# Patient Record
Sex: FEMALE | Race: WHITE | NOT HISPANIC OR LATINO | Employment: OTHER | RURAL
[De-identification: names, ages, dates, MRNs, and addresses within clinical notes are randomized per-mention and may not be internally consistent; named-entity substitution may affect disease eponyms.]

---

## 2017-02-07 ENCOUNTER — OFFICE VISIT (OUTPATIENT)
Dept: RETAIL CLINIC | Facility: CLINIC | Age: 27
End: 2017-02-07

## 2017-02-07 VITALS
HEIGHT: 64 IN | BODY MASS INDEX: 35 KG/M2 | OXYGEN SATURATION: 98 % | HEART RATE: 102 BPM | WEIGHT: 205 LBS | TEMPERATURE: 98.3 F | RESPIRATION RATE: 16 BRPM

## 2017-02-07 DIAGNOSIS — R05.9 COUGHING: ICD-10-CM

## 2017-02-07 DIAGNOSIS — J30.2 SEASONAL ALLERGIC RHINITIS, UNSPECIFIED ALLERGIC RHINITIS TRIGGER: ICD-10-CM

## 2017-02-07 DIAGNOSIS — J01.40 ACUTE PANSINUSITIS, RECURRENCE NOT SPECIFIED: Primary | ICD-10-CM

## 2017-02-07 PROCEDURE — 99213 OFFICE O/P EST LOW 20 MIN: CPT | Performed by: NURSE PRACTITIONER

## 2017-02-07 RX ORDER — PSEUDOEPHEDRINE HCL 120 MG/1
120 TABLET, FILM COATED, EXTENDED RELEASE ORAL EVERY 12 HOURS
Qty: 20 TABLET | Refills: 0 | Status: SHIPPED | OUTPATIENT
Start: 2017-02-07 | End: 2017-02-17

## 2017-02-07 RX ORDER — PREDNISONE 10 MG/1
TABLET ORAL DAILY
Qty: 21 EACH | Refills: 0 | Status: SHIPPED | OUTPATIENT
Start: 2017-02-07 | End: 2017-02-13

## 2017-02-07 RX ORDER — ALBUTEROL SULFATE 90 UG/1
2 AEROSOL, METERED RESPIRATORY (INHALATION) EVERY 4 HOURS PRN
Qty: 1 INHALER | Refills: 0 | Status: SHIPPED | OUTPATIENT
Start: 2017-02-07 | End: 2017-09-19 | Stop reason: SDUPTHER

## 2017-02-07 RX ORDER — AMOXICILLIN AND CLAVULANATE POTASSIUM 875; 125 MG/1; MG/1
1 TABLET, FILM COATED ORAL 2 TIMES DAILY
Qty: 20 TABLET | Refills: 0 | Status: SHIPPED | OUTPATIENT
Start: 2017-02-07 | End: 2017-02-17

## 2017-02-07 RX ORDER — LORATADINE 10 MG/1
10 TABLET ORAL DAILY
Qty: 30 TABLET | Refills: 5 | Status: SHIPPED | OUTPATIENT
Start: 2017-02-07 | End: 2017-03-09

## 2017-02-07 RX ORDER — DEXTROMETHORPHAN HYDROBROMIDE AND PROMETHAZINE HYDROCHLORIDE 15; 6.25 MG/5ML; MG/5ML
5 SYRUP ORAL 4 TIMES DAILY PRN
Qty: 240 ML | Refills: 0 | Status: SHIPPED | OUTPATIENT
Start: 2017-02-07 | End: 2017-05-19

## 2017-02-07 NOTE — PROGRESS NOTES
Subjective   Janina Galvez is a 26 y.o. female.     Sinus Problem   This is a new problem. The current episode started 1 to 4 weeks ago. The problem has been gradually worsening since onset. There has been no fever. The pain is moderate. Associated symptoms include congestion, coughing (persistent cough), headaches, a hoarse voice, sinus pressure, a sore throat and swollen glands. Pertinent negatives include no chills, ear pain, neck pain, shortness of breath or sneezing. Treatments tried: otc cough and congestion medication. The treatment provided no relief.   Cough   This is a new problem. Episode onset: 3 days. The problem has been gradually worsening. The problem occurs every few minutes. Associated symptoms include headaches, nasal congestion, postnasal drip, rhinorrhea and a sore throat. Pertinent negatives include no chest pain, chills, ear pain, fever, rash, shortness of breath or wheezing. The symptoms are aggravated by cold air and exercise. Risk factors for lung disease include smoking/tobacco exposure. She has tried OTC cough suppressant for the symptoms. The treatment provided no relief. Her past medical history is significant for asthma and bronchitis. There is no history of pneumonia.        The following portions of the patient's history were reviewed and updated as appropriate: allergies, current medications, past family history, past medical history, past social history, past surgical history and problem list.    Review of Systems   Constitutional: Negative for appetite change, chills and fever.   HENT: Positive for congestion, hoarse voice, postnasal drip, rhinorrhea, sinus pressure and sore throat. Negative for ear pain, sneezing and trouble swallowing.    Eyes: Negative.    Respiratory: Positive for cough (persistent cough) and chest tightness. Negative for shortness of breath and wheezing.    Cardiovascular: Negative.  Negative for chest pain.   Gastrointestinal: Negative for abdominal pain,  "diarrhea, nausea and vomiting.   Musculoskeletal: Negative.  Negative for neck pain.   Skin: Negative.  Negative for rash.   Neurological: Positive for headaches. Negative for dizziness.        Visit Vitals   • Pulse 102   • Temp 98.3 °F (36.8 °C)   • Resp 16   • Ht 64\" (162.6 cm)   • Wt 205 lb (93 kg)   • LMP 01/31/2017   • SpO2 98%   • BMI 35.19 kg/m2        Objective   Physical Exam   Constitutional: She is oriented to person, place, and time. Vital signs are normal. She appears well-developed and well-nourished.   HENT:   Head: Normocephalic.   Right Ear: External ear and ear canal normal. No drainage, swelling or tenderness. Tympanic membrane is bulging. Tympanic membrane is not erythematous.   Left Ear: External ear and ear canal normal. No drainage, swelling or tenderness. Tympanic membrane is bulging. Tympanic membrane is not erythematous.   Nose: Mucosal edema and rhinorrhea present. Right sinus exhibits maxillary sinus tenderness and frontal sinus tenderness. Left sinus exhibits maxillary sinus tenderness and frontal sinus tenderness.   Mouth/Throat: Uvula is midline, oropharynx is clear and moist and mucous membranes are normal. Tonsils are 0 on the right. Tonsils are 0 on the left. No tonsillar exudate.   Eyes: Conjunctivae are normal. Pupils are equal, round, and reactive to light.   Cardiovascular: Normal rate, regular rhythm, S1 normal, S2 normal and normal heart sounds.    Pulmonary/Chest: Effort normal and breath sounds normal. No respiratory distress. She has no wheezes.   Lymphadenopathy:        Head (right side): Tonsillar adenopathy present.        Head (left side): Tonsillar adenopathy present.     She has no cervical adenopathy.   Neurological: She is alert and oriented to person, place, and time.   Skin: Skin is warm, dry and intact. No rash noted.   Psychiatric: She has a normal mood and affect. Her speech is normal and behavior is normal. Thought content normal.   Vitals " reviewed.      Assessment/Plan   Janina was seen today for sinus problem.    Diagnoses and all orders for this visit:    Acute pansinusitis, recurrence not specified  -     amoxicillin-clavulanate (AUGMENTIN) 875-125 MG per tablet; Take 1 tablet by mouth 2 (Two) Times a Day for 10 days.  -     pseudoephedrine (SUDAFED) 120 MG 12 hr tablet; Take 1 tablet by mouth Every 12 (Twelve) Hours for 10 days.    Seasonal allergic rhinitis, unspecified allergic rhinitis trigger  -     pseudoephedrine (SUDAFED) 120 MG 12 hr tablet; Take 1 tablet by mouth Every 12 (Twelve) Hours for 10 days.  -     loratadine (CLARITIN) 10 MG tablet; Take 1 tablet by mouth Daily for 30 days.    Coughing  -     PredniSONE (DELTASONE) 10 MG (21) tablet pack; Take  by mouth Daily for 6 days.  -     albuterol (PROVENTIL HFA;VENTOLIN HFA) 108 (90 BASE) MCG/ACT inhaler; Inhale 2 puffs Every 4 (Four) Hours As Needed for wheezing or shortness of air.  -     promethazine-dextromethorphan (PROMETHAZINE-DM) 6.25-15 MG/5ML syrup; Take 5 mL by mouth 4 (Four) Times a Day As Needed for cough.

## 2017-02-07 NOTE — PATIENT INSTRUCTIONS
Sinusitis, Adult  Sinusitis is redness, soreness, and inflammation of the paranasal sinuses. Paranasal sinuses are air pockets within the bones of your face. They are located beneath your eyes, in the middle of your forehead, and above your eyes. In healthy paranasal sinuses, mucus is able to drain out, and air is able to circulate through them by way of your nose. However, when your paranasal sinuses are inflamed, mucus and air can become trapped. This can allow bacteria and other germs to grow and cause infection.  Sinusitis can develop quickly and last only a short time (acute) or continue over a long period (chronic). Sinusitis that lasts for more than 12 weeks is considered chronic.  CAUSES  Causes of sinusitis include:  · Allergies.  · Structural abnormalities, such as displacement of the cartilage that separates your nostrils (deviated septum), which can decrease the air flow through your nose and sinuses and affect sinus drainage.  · Functional abnormalities, such as when the small hairs (cilia) that line your sinuses and help remove mucus do not work properly or are not present.  SIGNS AND SYMPTOMS  Symptoms of acute and chronic sinusitis are the same. The primary symptoms are pain and pressure around the affected sinuses. Other symptoms include:  · Upper toothache.  · Earache.  · Headache.  · Bad breath.  · Decreased sense of smell and taste.  · A cough, which worsens when you are lying flat.  · Fatigue.  · Fever.  · Thick drainage from your nose, which often is green and may contain pus (purulent).  · Swelling and warmth over the affected sinuses.  DIAGNOSIS  Your health care provider will perform a physical exam. During your exam, your health care provider may perform any of the following to help determine if you have acute sinusitis or chronic sinusitis:  · Look in your nose for signs of abnormal growths in your nostrils (nasal polyps).  · Tap over the affected sinus to check for signs of  infection.  · View the inside of your sinuses using an imaging device that has a light attached (endoscope).  If your health care provider suspects that you have chronic sinusitis, one or more of the following tests may be recommended:  · Allergy tests.  · Nasal culture. A sample of mucus is taken from your nose, sent to a lab, and screened for bacteria.  · Nasal cytology. A sample of mucus is taken from your nose and examined by your health care provider to determine if your sinusitis is related to an allergy.  TREATMENT  Most cases of acute sinusitis are related to a viral infection and will resolve on their own within 10 days. Sometimes, medicines are prescribed to help relieve symptoms of both acute and chronic sinusitis. These may include pain medicines, decongestants, nasal steroid sprays, or saline sprays.  However, for sinusitis related to a bacterial infection, your health care provider will prescribe antibiotic medicines. These are medicines that will help kill the bacteria causing the infection.  Rarely, sinusitis is caused by a fungal infection. In these cases, your health care provider will prescribe antifungal medicine.  For some cases of chronic sinusitis, surgery is needed. Generally, these are cases in which sinusitis recurs more than 3 times per year, despite other treatments.  HOME CARE INSTRUCTIONS  · Drink plenty of water. Water helps thin the mucus so your sinuses can drain more easily.  · Use a humidifier.  · Inhale steam 3-4 times a day (for example, sit in the bathroom with the shower running).  · Apply a warm, moist washcloth to your face 3-4 times a day, or as directed by your health care provider.  · Use saline nasal sprays to help moisten and clean your sinuses.  · Take medicines only as directed by your health care provider.  · If you were prescribed either an antibiotic or antifungal medicine, finish it all even if you start to feel better.  SEEK IMMEDIATE MEDICAL CARE IF:  · You have  increasing pain or severe headaches.  · You have nausea, vomiting, or drowsiness.  · You have swelling around your face.  · You have vision problems.  · You have a stiff neck.  · You have difficulty breathing.     This information is not intended to replace advice given to you by your health care provider. Make sure you discuss any questions you have with your health care provider.     Document Released: 12/18/2006 Document Revised: 01/08/2016 Document Reviewed: 01/01/2013  [employer] Interactive Patient Education ©2016 [employer]    Cough, Adult  Coughing is a reflex that clears your throat and your airways. Coughing helps to heal and protect your lungs. It is normal to cough occasionally, but a cough that happens with other symptoms or lasts a long time may be a sign of a condition that needs treatment. A cough may last only 2-3 weeks (acute), or it may last longer than 8 weeks (chronic).  CAUSES  Coughing is commonly caused by:  · Breathing in substances that irritate your lungs.  · A viral or bacterial respiratory infection.  · Allergies.  · Asthma.  · Postnasal drip.  · Smoking.  · Acid backing up from the stomach into the esophagus (gastroesophageal reflux).  · Certain medicines.  · Chronic lung problems, including COPD (or rarely, lung cancer).  · Other medical conditions such as heart failure.  HOME CARE INSTRUCTIONS   Pay attention to any changes in your symptoms. Take these actions to help with your discomfort:  · Take medicines only as told by your health care provider.    If you were prescribed an antibiotic medicine, take it as told by your health care provider. Do not stop taking the antibiotic even if you start to feel better.    Talk with your health care provider before you take a cough suppressant medicine.  · Drink enough fluid to keep your urine clear or pale yellow.  · If the air is dry, use a cold steam vaporizer or humidifier in your bedroom or your home to help loosen secretions.  · Avoid  anything that causes you to cough at work or at home.  · If your cough is worse at night, try sleeping in a semi-upright position.  · Avoid cigarette smoke. If you smoke, quit smoking. If you need help quitting, ask your health care provider.  · Avoid caffeine.  · Avoid alcohol.  · Rest as needed.  SEEK MEDICAL CARE IF:   · You have new symptoms.  · You cough up pus.  · Your cough does not get better after 2-3 weeks, or your cough gets worse.  · You cannot control your cough with suppressant medicines and you are losing sleep.  · You develop pain that is getting worse or pain that is not controlled with pain medicines.  · You have a fever.  · You have unexplained weight loss.  · You have night sweats.  SEEK IMMEDIATE MEDICAL CARE IF:  · You cough up blood.  · You have difficulty breathing.  · Your heartbeat is very fast.     This information is not intended to replace advice given to you by your health care provider. Make sure you discuss any questions you have with your health care provider.     Document Released: 06/15/2012 Document Revised: 09/07/2016 Document Reviewed: 02/24/2016  [employer] Interactive Patient Education ©2016 [employer]

## 2017-05-19 ENCOUNTER — OFFICE VISIT (OUTPATIENT)
Dept: RETAIL CLINIC | Facility: CLINIC | Age: 27
End: 2017-05-19

## 2017-05-19 VITALS
BODY MASS INDEX: 35.34 KG/M2 | TEMPERATURE: 98.7 F | RESPIRATION RATE: 14 BRPM | HEIGHT: 64 IN | WEIGHT: 207 LBS | HEART RATE: 94 BPM | OXYGEN SATURATION: 99 %

## 2017-05-19 DIAGNOSIS — J02.9 SORE THROAT: Primary | ICD-10-CM

## 2017-05-19 LAB
EXPIRATION DATE: NORMAL
INTERNAL CONTROL: NORMAL
Lab: NORMAL
S PYO AG THROAT QL: NEGATIVE

## 2017-05-19 PROCEDURE — 99213 OFFICE O/P EST LOW 20 MIN: CPT | Performed by: NURSE PRACTITIONER

## 2017-05-19 PROCEDURE — 87880 STREP A ASSAY W/OPTIC: CPT | Performed by: NURSE PRACTITIONER

## 2017-05-19 RX ORDER — AZITHROMYCIN 250 MG/1
TABLET, FILM COATED ORAL
Qty: 6 TABLET | Refills: 0 | Status: SHIPPED | OUTPATIENT
Start: 2017-05-19 | End: 2017-09-19

## 2017-05-20 ENCOUNTER — HOSPITAL ENCOUNTER (EMERGENCY)
Facility: HOSPITAL | Age: 27
Discharge: HOME | End: 2017-05-20
Attending: STUDENT IN AN ORGANIZED HEALTH CARE EDUCATION/TRAINING PROGRAM | Admitting: STUDENT IN AN ORGANIZED HEALTH CARE EDUCATION/TRAINING PROGRAM

## 2017-05-20 VITALS
DIASTOLIC BLOOD PRESSURE: 72 MMHG | RESPIRATION RATE: 15 BRPM | WEIGHT: 205 LBS | HEIGHT: 64 IN | SYSTOLIC BLOOD PRESSURE: 100 MMHG | OXYGEN SATURATION: 99 % | TEMPERATURE: 97.9 F | BODY MASS INDEX: 35 KG/M2 | HEART RATE: 75 BPM

## 2017-05-20 DIAGNOSIS — R42 LIGHTHEADEDNESS: Primary | ICD-10-CM

## 2017-05-20 LAB
ALBUMIN SERPL-MCNC: 4.5 G/DL (ref 3.5–5)
ALBUMIN/GLOB SERPL: 1.4 G/DL (ref 1–2)
ALP SERPL-CCNC: 90 U/L (ref 38–126)
ALT SERPL W P-5'-P-CCNC: 26 U/L (ref 13–69)
ANION GAP SERPL CALCULATED.3IONS-SCNC: 13.5 MMOL/L
AST SERPL-CCNC: 27 U/L (ref 15–46)
B-HCG UR QL: NEGATIVE
BACTERIA UR QL AUTO: ABNORMAL /HPF
BASOPHILS # BLD AUTO: 0.04 10*3/MM3 (ref 0–0.2)
BASOPHILS NFR BLD AUTO: 0.5 % (ref 0–2.5)
BILIRUB SERPL-MCNC: 0.8 MG/DL (ref 0.2–1.3)
BILIRUB UR QL STRIP: NEGATIVE
BUN BLD-MCNC: 15 MG/DL (ref 7–20)
BUN/CREAT SERPL: 16.7 (ref 7.1–23.5)
CALCIUM SPEC-SCNC: 9.3 MG/DL (ref 8.4–10.2)
CHLORIDE SERPL-SCNC: 105 MMOL/L (ref 98–107)
CLARITY UR: CLEAR
CO2 SERPL-SCNC: 28 MMOL/L (ref 26–30)
COLOR UR: YELLOW
CREAT BLD-MCNC: 0.9 MG/DL (ref 0.6–1.3)
DEPRECATED RDW RBC AUTO: 45.6 FL (ref 37–54)
EOSINOPHIL # BLD AUTO: 0.32 10*3/MM3 (ref 0–0.7)
EOSINOPHIL NFR BLD AUTO: 3.7 % (ref 0–7)
ERYTHROCYTE [DISTWIDTH] IN BLOOD BY AUTOMATED COUNT: 13.2 % (ref 11.5–14.5)
GFR SERPL CREATININE-BSD FRML MDRD: 77 ML/MIN/1.73
GLOBULIN UR ELPH-MCNC: 3.3 GM/DL
GLUCOSE BLD-MCNC: 95 MG/DL (ref 74–98)
GLUCOSE BLDC GLUCOMTR-MCNC: 136 MG/DL (ref 70–130)
GLUCOSE UR STRIP-MCNC: NEGATIVE MG/DL
HCT VFR BLD AUTO: 39.3 % (ref 37–47)
HGB BLD-MCNC: 12.8 G/DL (ref 12–16)
HGB UR QL STRIP.AUTO: ABNORMAL
HOLD SPECIMEN: NORMAL
HOLD SPECIMEN: NORMAL
HYALINE CASTS UR QL AUTO: ABNORMAL /LPF
IMM GRANULOCYTES # BLD: 0.05 10*3/MM3 (ref 0–0.06)
IMM GRANULOCYTES NFR BLD: 0.6 % (ref 0–0.6)
KETONES UR QL STRIP: NEGATIVE
LEUKOCYTE ESTERASE UR QL STRIP.AUTO: ABNORMAL
LYMPHOCYTES # BLD AUTO: 1.6 10*3/MM3 (ref 0.6–3.4)
LYMPHOCYTES NFR BLD AUTO: 18.3 % (ref 10–50)
MCH RBC QN AUTO: 31.1 PG (ref 27–31)
MCHC RBC AUTO-ENTMCNC: 32.6 G/DL (ref 30–37)
MCV RBC AUTO: 95.6 FL (ref 81–99)
MONOCYTES # BLD AUTO: 0.56 10*3/MM3 (ref 0–0.9)
MONOCYTES NFR BLD AUTO: 6.4 % (ref 0–12)
MUCOUS THREADS URNS QL MICRO: ABNORMAL /HPF
NEUTROPHILS # BLD AUTO: 6.15 10*3/MM3 (ref 2–6.9)
NEUTROPHILS NFR BLD AUTO: 70.5 % (ref 37–80)
NITRITE UR QL STRIP: NEGATIVE
NRBC BLD MANUAL-RTO: 0 /100 WBC (ref 0–0)
PH UR STRIP.AUTO: 5.5 [PH] (ref 5–8)
PLATELET # BLD AUTO: 196 10*3/MM3 (ref 130–400)
PMV BLD AUTO: 9.8 FL (ref 6–12)
POTASSIUM BLD-SCNC: 3.5 MMOL/L (ref 3.5–5.1)
PROT SERPL-MCNC: 7.8 G/DL (ref 6.3–8.2)
PROT UR QL STRIP: NEGATIVE
RBC # BLD AUTO: 4.11 10*6/MM3 (ref 4.2–5.4)
RBC # UR: ABNORMAL /HPF
REF LAB TEST METHOD: ABNORMAL
SODIUM BLD-SCNC: 143 MMOL/L (ref 137–145)
SP GR UR STRIP: >=1.03 (ref 1–1.03)
SQUAMOUS #/AREA URNS HPF: ABNORMAL /HPF
UROBILINOGEN UR QL STRIP: ABNORMAL
WBC NRBC COR # BLD: 8.72 10*3/MM3 (ref 4.8–10.8)
WBC UR QL AUTO: ABNORMAL /HPF
WHOLE BLOOD HOLD SPECIMEN: NORMAL
WHOLE BLOOD HOLD SPECIMEN: NORMAL

## 2017-05-20 PROCEDURE — 93005 ELECTROCARDIOGRAM TRACING: CPT | Performed by: STUDENT IN AN ORGANIZED HEALTH CARE EDUCATION/TRAINING PROGRAM

## 2017-05-20 PROCEDURE — 85025 COMPLETE CBC W/AUTO DIFF WBC: CPT | Performed by: STUDENT IN AN ORGANIZED HEALTH CARE EDUCATION/TRAINING PROGRAM

## 2017-05-20 PROCEDURE — 81025 URINE PREGNANCY TEST: CPT | Performed by: STUDENT IN AN ORGANIZED HEALTH CARE EDUCATION/TRAINING PROGRAM

## 2017-05-20 PROCEDURE — 87086 URINE CULTURE/COLONY COUNT: CPT | Performed by: STUDENT IN AN ORGANIZED HEALTH CARE EDUCATION/TRAINING PROGRAM

## 2017-05-20 PROCEDURE — 96361 HYDRATE IV INFUSION ADD-ON: CPT

## 2017-05-20 PROCEDURE — 99283 EMERGENCY DEPT VISIT LOW MDM: CPT

## 2017-05-20 PROCEDURE — 96360 HYDRATION IV INFUSION INIT: CPT

## 2017-05-20 PROCEDURE — 80053 COMPREHEN METABOLIC PANEL: CPT | Performed by: STUDENT IN AN ORGANIZED HEALTH CARE EDUCATION/TRAINING PROGRAM

## 2017-05-20 PROCEDURE — 82962 GLUCOSE BLOOD TEST: CPT

## 2017-05-20 PROCEDURE — 81001 URINALYSIS AUTO W/SCOPE: CPT | Performed by: STUDENT IN AN ORGANIZED HEALTH CARE EDUCATION/TRAINING PROGRAM

## 2017-05-20 RX ORDER — SODIUM CHLORIDE 0.9 % (FLUSH) 0.9 %
10 SYRINGE (ML) INJECTION AS NEEDED
Status: DISCONTINUED | OUTPATIENT
Start: 2017-05-20 | End: 2017-05-20 | Stop reason: HOSPADM

## 2017-05-20 RX ORDER — HYDROCODONE BITARTRATE AND ACETAMINOPHEN 5; 325 MG/1; MG/1
1 TABLET ORAL EVERY 6 HOURS PRN
COMMUNITY
End: 2017-09-19

## 2017-05-20 RX ADMIN — SODIUM CHLORIDE 1000 ML: 9 INJECTION, SOLUTION INTRAVENOUS at 09:27

## 2017-05-22 LAB — BACTERIA SPEC AEROBE CULT: NO GROWTH

## 2017-07-25 DIAGNOSIS — J30.2 SEASONAL ALLERGIC RHINITIS, UNSPECIFIED ALLERGIC RHINITIS TRIGGER: ICD-10-CM

## 2017-07-25 RX ORDER — BENZOYL PEROXIDE
KIT TOPICAL
Qty: 30 TABLET | Refills: 0 | OUTPATIENT
Start: 2017-07-25

## 2017-09-19 ENCOUNTER — OFFICE VISIT (OUTPATIENT)
Dept: RETAIL CLINIC | Facility: CLINIC | Age: 27
End: 2017-09-19

## 2017-09-19 VITALS
RESPIRATION RATE: 20 BRPM | HEIGHT: 64 IN | TEMPERATURE: 98.7 F | WEIGHT: 208 LBS | BODY MASS INDEX: 35.51 KG/M2 | OXYGEN SATURATION: 99 % | HEART RATE: 107 BPM

## 2017-09-19 DIAGNOSIS — J01.41 ACUTE RECURRENT PANSINUSITIS: Primary | ICD-10-CM

## 2017-09-19 DIAGNOSIS — R05.9 COUGHING: ICD-10-CM

## 2017-09-19 PROCEDURE — 99213 OFFICE O/P EST LOW 20 MIN: CPT | Performed by: NURSE PRACTITIONER

## 2017-09-19 RX ORDER — ALBUTEROL SULFATE 90 UG/1
2 AEROSOL, METERED RESPIRATORY (INHALATION) EVERY 4 HOURS PRN
Qty: 1 INHALER | Refills: 0 | Status: SHIPPED | OUTPATIENT
Start: 2017-09-19 | End: 2019-09-19

## 2017-09-19 RX ORDER — PSEUDOEPHEDRINE HCL 120 MG/1
120 TABLET, FILM COATED, EXTENDED RELEASE ORAL EVERY 12 HOURS
Qty: 20 TABLET | Refills: 0 | Status: SHIPPED | OUTPATIENT
Start: 2017-09-19 | End: 2017-09-29

## 2017-09-19 RX ORDER — AMOXICILLIN AND CLAVULANATE POTASSIUM 875; 125 MG/1; MG/1
1 TABLET, FILM COATED ORAL 2 TIMES DAILY
Qty: 20 TABLET | Refills: 0 | Status: SHIPPED | OUTPATIENT
Start: 2017-09-19 | End: 2017-09-29

## 2017-09-19 RX ORDER — BROMPHENIRAMINE MALEATE, PSEUDOEPHEDRINE HYDROCHLORIDE, AND DEXTROMETHORPHAN HYDROBROMIDE 2; 30; 10 MG/5ML; MG/5ML; MG/5ML
5 SYRUP ORAL 4 TIMES DAILY PRN
Qty: 120 ML | Refills: 0 | Status: SHIPPED | OUTPATIENT
Start: 2017-09-19 | End: 2017-09-24

## 2017-09-19 NOTE — PROGRESS NOTES
Subjective   Janina Galvez is a 27 y.o. female.     Sinus Problem   This is a recurrent problem. Episode onset: 2 weeks. The problem has been gradually worsening since onset. There has been no fever. The pain is severe. Associated symptoms include congestion, coughing (persistent), headaches, a hoarse voice, sinus pressure, a sore throat and swollen glands. Pertinent negatives include no chills, ear pain, shortness of breath or sneezing. Past treatments include acetaminophen. The treatment provided no relief.   Cough   This is a new problem. The current episode started in the past 7 days. The problem has been gradually worsening. The problem occurs constantly. The cough is non-productive. Associated symptoms include headaches, postnasal drip, rhinorrhea and a sore throat. Pertinent negatives include no chest pain, chills, ear congestion, ear pain, fever, shortness of breath or wheezing. Risk factors for lung disease include smoking/tobacco exposure. She has tried OTC cough suppressant for the symptoms. The treatment provided no relief. Her past medical history is significant for asthma and bronchitis. There is no history of pneumonia.        The following portions of the patient's history were reviewed and updated as appropriate: allergies, current medications, past medical history, past social history, past surgical history and problem list.    Review of Systems   Constitutional: Negative for appetite change, chills and fever.   HENT: Positive for congestion, hoarse voice, postnasal drip, rhinorrhea, sinus pressure and sore throat. Negative for ear pain, sneezing and trouble swallowing.    Eyes: Negative.    Respiratory: Positive for cough (persistent) and chest tightness. Negative for shortness of breath and wheezing.    Cardiovascular: Negative.  Negative for chest pain.   Gastrointestinal: Negative for abdominal pain, diarrhea, nausea and vomiting.   Musculoskeletal: Negative.    Skin: Negative.    Neurological:  "Positive for headaches. Negative for dizziness.   Hematological: Positive for adenopathy.        Pulse 107  Temp 98.7 °F (37.1 °C)  Resp 20  Ht 64\" (162.6 cm)  Wt 208 lb (94.3 kg)  LMP 08/27/2017  SpO2 99%  BMI 35.7 kg/m2     Objective   Physical Exam   Constitutional: She is oriented to person, place, and time. Vital signs are normal. She appears well-developed and well-nourished.   HENT:   Head: Normocephalic.   Right Ear: External ear and ear canal normal. No drainage, swelling or tenderness. Tympanic membrane is bulging. Tympanic membrane is not erythematous.   Left Ear: External ear and ear canal normal. No drainage, swelling or tenderness. Tympanic membrane is bulging. Tympanic membrane is not erythematous.   Nose: Mucosal edema and rhinorrhea present. Right sinus exhibits maxillary sinus tenderness and frontal sinus tenderness. Left sinus exhibits maxillary sinus tenderness and frontal sinus tenderness.   Mouth/Throat: Uvula is midline, oropharynx is clear and moist and mucous membranes are normal. Tonsils are 0 on the right. Tonsils are 0 on the left. No tonsillar exudate.   Eyes: Conjunctivae are normal. Pupils are equal, round, and reactive to light.   Cardiovascular: Normal rate, regular rhythm, S1 normal, S2 normal and normal heart sounds.    Pulmonary/Chest: Effort normal and breath sounds normal. No respiratory distress. She has no wheezes.   Lymphadenopathy:        Head (right side): Tonsillar adenopathy present.        Head (left side): Tonsillar adenopathy present.     She has no cervical adenopathy.   Neurological: She is alert and oriented to person, place, and time.   Skin: Skin is warm, dry and intact. No rash noted.   Psychiatric: She has a normal mood and affect. Her speech is normal and behavior is normal. Thought content normal.   Vitals reviewed.      Assessment/Plan   Janina was seen today for sinus problem and cough.    Diagnoses and all orders for this visit:    Acute recurrent " pansinusitis  -     amoxicillin-clavulanate (AUGMENTIN) 875-125 MG per tablet; Take 1 tablet by mouth 2 (Two) Times a Day for 10 days.  -     pseudoephedrine (SUDAFED) 120 MG 12 hr tablet; Take 1 tablet by mouth Every 12 (Twelve) Hours for 10 days.    Coughing  -     brompheniramine-pseudoephedrine-DM 30-2-10 MG/5ML syrup; Take 5 mL by mouth 4 (Four) Times a Day As Needed for Cough for up to 5 days.  -     albuterol (PROVENTIL HFA;VENTOLIN HFA) 108 (90 Base) MCG/ACT inhaler; Inhale 2 puffs Every 4 (Four) Hours As Needed for Wheezing or Shortness of Air.

## 2018-09-28 DIAGNOSIS — R05.9 COUGHING: ICD-10-CM

## 2018-09-28 RX ORDER — ALBUTEROL SULFATE 90 UG/1
AEROSOL, METERED RESPIRATORY (INHALATION)
Refills: 0 | OUTPATIENT
Start: 2018-09-28

## 2019-05-12 ENCOUNTER — OFFICE VISIT (OUTPATIENT)
Dept: RETAIL CLINIC | Facility: CLINIC | Age: 29
End: 2019-05-12

## 2019-05-12 VITALS
TEMPERATURE: 99.4 F | WEIGHT: 203 LBS | OXYGEN SATURATION: 98 % | SYSTOLIC BLOOD PRESSURE: 110 MMHG | BODY MASS INDEX: 34.66 KG/M2 | HEIGHT: 64 IN | HEART RATE: 100 BPM | RESPIRATION RATE: 18 BRPM | DIASTOLIC BLOOD PRESSURE: 80 MMHG

## 2019-05-12 DIAGNOSIS — N30.01 ACUTE CYSTITIS WITH HEMATURIA: Primary | ICD-10-CM

## 2019-05-12 LAB
BILIRUB BLD-MCNC: NEGATIVE MG/DL
CLARITY, POC: ABNORMAL
COLOR UR: YELLOW
GLUCOSE UR STRIP-MCNC: NEGATIVE MG/DL
KETONES UR QL: NEGATIVE
LEUKOCYTE EST, POC: ABNORMAL
NITRITE UR-MCNC: POSITIVE MG/ML
PH UR: 7 [PH] (ref 5–8)
PROT UR STRIP-MCNC: NEGATIVE MG/DL
RBC # UR STRIP: ABNORMAL /UL
SP GR UR: 1.02 (ref 1–1.03)
UROBILINOGEN UR QL: NORMAL

## 2019-05-12 PROCEDURE — 99213 OFFICE O/P EST LOW 20 MIN: CPT | Performed by: NURSE PRACTITIONER

## 2019-05-12 RX ORDER — ONDANSETRON HYDROCHLORIDE 8 MG/1
8 TABLET, FILM COATED ORAL EVERY 8 HOURS PRN
Qty: 9 TABLET | Refills: 0 | Status: SHIPPED | OUTPATIENT
Start: 2019-05-12 | End: 2019-05-15

## 2019-05-12 RX ORDER — PHENAZOPYRIDINE HYDROCHLORIDE 100 MG/1
100 TABLET, FILM COATED ORAL 3 TIMES DAILY PRN
Qty: 6 TABLET | Refills: 0 | Status: SHIPPED | OUTPATIENT
Start: 2019-05-12 | End: 2019-05-14

## 2019-05-12 RX ORDER — NITROFURANTOIN 25; 75 MG/1; MG/1
100 CAPSULE ORAL 2 TIMES DAILY
Qty: 14 CAPSULE | Refills: 0 | Status: SHIPPED | OUTPATIENT
Start: 2019-05-12 | End: 2019-05-17

## 2019-05-12 NOTE — PROGRESS NOTES
"Subjective   Janina Galvez is a 28 y.o. female.   /80   Pulse 100   Temp 99.4 °F (37.4 °C)   Resp 18   Ht 162.6 cm (64\")   Wt 92.1 kg (203 lb)   SpO2 98%   BMI 34.84 kg/m²   Past Medical History:   Diagnosis Date   • Allergic    • Asthma    • Gallbladder abscess      Allergies   Allergen Reactions   • Tramadol Swelling   • Sulfa Antibiotics Rash         Urinary Tract Infection    This is a new problem. The current episode started yesterday. The problem has been gradually worsening (only symtpoms is foul smelling urine). The pain is mild. There has been no fever. Associated symptoms include frequency, nausea and urgency. Pertinent negatives include no chills, discharge, flank pain, hematuria, hesitancy, possible pregnancy, sweats or vomiting.        The following portions of the patient's history were reviewed and updated as appropriate: allergies, current medications, past family history, past medical history, past social history, past surgical history and problem list.    Review of Systems   Constitutional: Negative for chills.   Gastrointestinal: Positive for nausea. Negative for vomiting.   Genitourinary: Positive for frequency and urgency. Negative for flank pain, hematuria and hesitancy.       Objective   Physical Exam   Constitutional: She appears well-developed and well-nourished.  Non-toxic appearance. She appears ill.   HENT:   Head: Normocephalic and atraumatic.   Cardiovascular: Regular rhythm and normal heart sounds.   Pulmonary/Chest: Effort normal. She has no wheezes. She has no rhonchi. She has no rales.   Abdominal: Soft. Normal appearance and bowel sounds are normal. There is no tenderness. There is no rigidity, no rebound, no guarding, no CVA tenderness, no tenderness at McBurney's point and negative Calderon's sign.   Lymphadenopathy:     She has no cervical adenopathy.   Skin: Skin is warm and dry.       Assessment/Plan   Diagnoses and all orders for this visit:    Acute cystitis with " hematuria  -     POC Urinalysis Dipstick, Automated  -     Urine Culture - Urine, Urine, Clean Catch    Other orders  -     nitrofurantoin, macrocrystal-monohydrate, (MACROBID) 100 MG capsule; Take 1 capsule by mouth 2 (Two) Times a Day for 7 days.  -     phenazopyridine (PYRIDIUM) 100 MG tablet; Take 1 tablet by mouth 3 (Three) Times a Day As Needed for bladder spasms for up to 2 days.  -     ondansetron (ZOFRAN) 8 MG tablet; Take 1 tablet by mouth Every 8 (Eight) Hours As Needed for Nausea for up to 3 days.        Results for orders placed or performed in visit on 05/12/19   POC Urinalysis Dipstick, Automated   Result Value Ref Range    Color Yellow Yellow, Straw, Dark Yellow, Abril    Clarity, UA Slightly Cloudy (A) Clear    Specific Gravity  1.020 1.005 - 1.030    pH, Urine 7.0 5.0 - 8.0    Leukocytes Trace (A) Negative    Nitrite, UA Positive (A) Negative    Protein, POC Negative Negative mg/dL    Glucose, UA Negative Negative, 1000 mg/dL (3+) mg/dL    Ketones, UA Negative Negative    Urobilinogen, UA Normal Normal    Bilirubin Negative Negative    Blood, UA Small (A) Negative

## 2019-05-15 LAB
BACTERIA UR CULT: ABNORMAL
BACTERIA UR CULT: ABNORMAL
OTHER ANTIBIOTIC SUSC ISLT: ABNORMAL

## 2019-05-16 ENCOUNTER — TELEPHONE (OUTPATIENT)
Dept: RETAIL CLINIC | Facility: CLINIC | Age: 29
End: 2019-05-16

## 2019-05-16 NOTE — TELEPHONE ENCOUNTER
Patient notified of that urine culture returned and current medication is appropriate for patient's infection. Instructed to contact clinic with any questions.

## 2019-05-17 ENCOUNTER — TELEPHONE (OUTPATIENT)
Dept: RETAIL CLINIC | Facility: CLINIC | Age: 29
End: 2019-05-17

## 2019-05-17 RX ORDER — CEPHALEXIN 500 MG/1
500 CAPSULE ORAL 2 TIMES DAILY
Qty: 14 CAPSULE | Refills: 0 | Status: SHIPPED | OUTPATIENT
Start: 2019-05-17 | End: 2019-05-24

## 2019-05-17 NOTE — TELEPHONE ENCOUNTER
PT had a skin rash after taking Macrobid. Pt had a UTI that is susceptible to keflex. Keflex prescribed and education done with patient as well as answered questions. PT to f/maciel with PCP if s/s do not improve.

## 2019-07-11 ENCOUNTER — OFFICE VISIT (OUTPATIENT)
Dept: RETAIL CLINIC | Facility: CLINIC | Age: 29
End: 2019-07-11

## 2019-07-11 VITALS
WEIGHT: 197 LBS | RESPIRATION RATE: 14 BRPM | OXYGEN SATURATION: 99 % | HEIGHT: 64 IN | BODY MASS INDEX: 33.63 KG/M2 | TEMPERATURE: 98.3 F | HEART RATE: 77 BPM | DIASTOLIC BLOOD PRESSURE: 70 MMHG | SYSTOLIC BLOOD PRESSURE: 116 MMHG

## 2019-07-11 DIAGNOSIS — H60.311 ACUTE DIFFUSE OTITIS EXTERNA OF RIGHT EAR: ICD-10-CM

## 2019-07-11 DIAGNOSIS — H65.01 RIGHT ACUTE SEROUS OTITIS MEDIA, RECURRENCE NOT SPECIFIED: Primary | ICD-10-CM

## 2019-07-11 PROBLEM — K21.9 GERD (GASTROESOPHAGEAL REFLUX DISEASE): Status: ACTIVE | Noted: 2019-07-11

## 2019-07-11 PROBLEM — I38 HEART VALVE DISORDER: Status: ACTIVE | Noted: 2018-07-27

## 2019-07-11 PROBLEM — J30.9 ALLERGIC RHINITIS: Status: ACTIVE | Noted: 2019-07-11

## 2019-07-11 PROBLEM — N92.6 MENSES, IRREGULAR: Status: ACTIVE | Noted: 2019-07-11

## 2019-07-11 PROBLEM — J45.909 ASTHMA: Status: ACTIVE | Noted: 2018-07-27

## 2019-07-11 PROCEDURE — 99213 OFFICE O/P EST LOW 20 MIN: CPT | Performed by: NURSE PRACTITIONER

## 2019-07-11 RX ORDER — PRENATAL 71/IRON/FOLIC AC/DHA 30-1.4-2
CAPSULE,IMMEDIATE, DELAY RELEASE,BIPHASE ORAL
COMMUNITY
Start: 2014-07-30 | End: 2019-09-19

## 2019-07-11 RX ORDER — IBUPROFEN 800 MG/1
800 TABLET ORAL EVERY 8 HOURS PRN
Qty: 90 TABLET | Refills: 0 | Status: SHIPPED | OUTPATIENT
Start: 2019-07-11 | End: 2019-09-19

## 2019-07-11 RX ORDER — PSEUDOEPHEDRINE HCL 120 MG/1
120 TABLET, FILM COATED, EXTENDED RELEASE ORAL EVERY 12 HOURS
Qty: 20 TABLET | Refills: 0 | Status: SHIPPED | OUTPATIENT
Start: 2019-07-11 | End: 2019-07-21

## 2019-07-11 RX ORDER — PREDNISONE 10 MG/1
TABLET ORAL
Qty: 24 TABLET | Refills: 0 | Status: SHIPPED | OUTPATIENT
Start: 2019-07-11 | End: 2019-09-19

## 2019-07-11 RX ORDER — BUDESONIDE AND FORMOTEROL FUMARATE DIHYDRATE 80; 4.5 UG/1; UG/1
AEROSOL RESPIRATORY (INHALATION) EVERY 12 HOURS SCHEDULED
COMMUNITY
End: 2019-09-19

## 2019-07-11 NOTE — PROGRESS NOTES
"Subjective   Janina Galvez is a 28 y.o. female.     Earache    There is pain in the right ear. This is a new problem. Episode onset: 3 days. The problem occurs constantly. The problem has been rapidly worsening. There has been no fever. The pain is severe. Associated symptoms include ear discharge (clear), hearing loss and rhinorrhea. Pertinent negatives include no abdominal pain, coughing, diarrhea, headaches, neck pain, rash, sore throat or vomiting. Treatments tried: allergy medications. The treatment provided no relief. There is no history of a chronic ear infection, hearing loss or a tympanostomy tube.        The following portions of the patient's history were reviewed and updated as appropriate: allergies, current medications, past medical history, past social history, past surgical history and problem list.    Review of Systems   Constitutional: Negative for appetite change, chills and fever.   HENT: Positive for congestion, ear discharge (clear), ear pain (right, severe), hearing loss, postnasal drip, rhinorrhea and sinus pressure. Negative for sinus pain, sneezing, sore throat and trouble swallowing.    Eyes: Negative.    Respiratory: Negative for cough, shortness of breath and wheezing.    Cardiovascular: Negative.    Gastrointestinal: Negative for abdominal pain, diarrhea, nausea and vomiting.   Musculoskeletal: Negative.  Negative for neck pain.   Skin: Negative.  Negative for rash.   Neurological: Negative for dizziness and headaches.   Hematological: Negative for adenopathy.        /70   Pulse 77   Temp 98.3 °F (36.8 °C)   Resp 14   Ht 162.6 cm (64\")   Wt 89.4 kg (197 lb)   LMP 07/11/2019   SpO2 99%   BMI 33.81 kg/m²      Objective   Physical Exam   Constitutional: She is oriented to person, place, and time. Vital signs are normal. She appears well-developed and well-nourished. No distress.   HENT:   Head: Normocephalic.   Right Ear: External ear normal. There is drainage (clear), swelling " and tenderness. Tympanic membrane is bulging. Tympanic membrane is not erythematous. A middle ear effusion is present.   Left Ear: External ear and ear canal normal. No drainage, swelling or tenderness. Tympanic membrane is bulging. Tympanic membrane is not erythematous.  No middle ear effusion.   Nose: Mucosal edema and rhinorrhea present. Right sinus exhibits no maxillary sinus tenderness and no frontal sinus tenderness. Left sinus exhibits no maxillary sinus tenderness and no frontal sinus tenderness.   Mouth/Throat: Uvula is midline, oropharynx is clear and moist and mucous membranes are normal. Tonsils are 0 on the right. Tonsils are 0 on the left. No tonsillar exudate.   Neck: Normal range of motion. Neck supple.   Cardiovascular: Normal rate, regular rhythm, S1 normal, S2 normal and normal heart sounds.   Pulmonary/Chest: Effort normal and breath sounds normal. No stridor. No respiratory distress. She has no decreased breath sounds. She has no wheezes. She has no rhonchi. She has no rales.   Lymphadenopathy:        Head (right side): No tonsillar, no preauricular and no posterior auricular adenopathy present.        Head (left side): No tonsillar, no preauricular and no posterior auricular adenopathy present.     She has no cervical adenopathy.   Neurological: She is alert and oriented to person, place, and time.   Skin: Skin is warm and dry. No rash noted. She is not diaphoretic.   Psychiatric: She has a normal mood and affect. Her speech is normal and behavior is normal. Thought content normal.   Vitals reviewed.      Assessment/Plan   Janina was seen today for earache.    Diagnoses and all orders for this visit:    Right acute serous otitis media, recurrence not specified  -     predniSONE (DELTASONE) 10 MG tablet; 6 tabs po x 2 days; 4 tabs po x 2 days; 2 tabs po x 2 days; stop/ tapering dose x 6 days  -     pseudoephedrine (SUDAFED) 120 MG 12 hr tablet; Take 1 tablet by mouth Every 12 (Twelve) Hours for  10 days.    Acute diffuse otitis externa of right ear  -     ibuprofen (ADVIL,MOTRIN) 800 MG tablet; Take 1 tablet by mouth Every 8 (Eight) Hours As Needed for Mild Pain .  -     neomycin-polymyxin-hydrocortisone (CORTISPORIN) 3.5-86661-0 otic solution; Administer 3 drops into the left ear 4 (Four) Times a Day for 7 days.

## 2019-09-19 ENCOUNTER — OFFICE VISIT (OUTPATIENT)
Dept: RETAIL CLINIC | Facility: CLINIC | Age: 29
End: 2019-09-19

## 2019-09-19 VITALS
HEIGHT: 64 IN | SYSTOLIC BLOOD PRESSURE: 116 MMHG | BODY MASS INDEX: 33.73 KG/M2 | DIASTOLIC BLOOD PRESSURE: 80 MMHG | WEIGHT: 197.6 LBS | TEMPERATURE: 97.5 F | RESPIRATION RATE: 15 BRPM | OXYGEN SATURATION: 99 % | HEART RATE: 91 BPM

## 2019-09-19 DIAGNOSIS — Z32.02 PREGNANCY TEST NEGATIVE: ICD-10-CM

## 2019-09-19 DIAGNOSIS — R11.0 NAUSEA: ICD-10-CM

## 2019-09-19 DIAGNOSIS — N39.0 ACUTE UTI: Primary | ICD-10-CM

## 2019-09-19 LAB
B-HCG UR QL: NEGATIVE
BILIRUB BLD-MCNC: NEGATIVE MG/DL
CLARITY, POC: ABNORMAL
COLOR UR: ABNORMAL
GLUCOSE UR STRIP-MCNC: NEGATIVE MG/DL
INTERNAL NEGATIVE CONTROL: NORMAL
INTERNAL POSITIVE CONTROL: POSITIVE
KETONES UR QL: NEGATIVE
LEUKOCYTE EST, POC: ABNORMAL
Lab: NORMAL
NITRITE UR-MCNC: NEGATIVE MG/ML
PH UR: 6.5 [PH] (ref 5–8)
PROT UR STRIP-MCNC: NEGATIVE MG/DL
RBC # UR STRIP: ABNORMAL /UL
SP GR UR: 1.02 (ref 1–1.03)
UROBILINOGEN UR QL: NORMAL

## 2019-09-19 PROCEDURE — 99213 OFFICE O/P EST LOW 20 MIN: CPT | Performed by: NURSE PRACTITIONER

## 2019-09-19 PROCEDURE — 81025 URINE PREGNANCY TEST: CPT | Performed by: NURSE PRACTITIONER

## 2019-09-19 RX ORDER — PHENAZOPYRIDINE HYDROCHLORIDE 200 MG/1
200 TABLET, FILM COATED ORAL 3 TIMES DAILY PRN
Qty: 6 TABLET | Refills: 0 | Status: SHIPPED | OUTPATIENT
Start: 2019-09-19 | End: 2019-09-21

## 2019-09-19 RX ORDER — ONDANSETRON 4 MG/1
4 TABLET, ORALLY DISINTEGRATING ORAL EVERY 8 HOURS PRN
Qty: 15 TABLET | Refills: 0 | Status: SHIPPED | OUTPATIENT
Start: 2019-09-19 | End: 2019-09-24

## 2019-09-19 RX ORDER — NITROFURANTOIN 25; 75 MG/1; MG/1
100 CAPSULE ORAL EVERY 12 HOURS SCHEDULED
Qty: 14 CAPSULE | Refills: 0 | Status: SHIPPED | OUTPATIENT
Start: 2019-09-19 | End: 2019-09-26

## 2019-09-19 NOTE — PROGRESS NOTES
"Alayna Galvez is a 29 y.o. female.     Urinary Tract Infection    This is a recurrent problem. Episode onset: 2 days. The problem occurs every urination. The problem has been gradually worsening. The quality of the pain is described as burning and aching. The pain is moderate. There has been no fever. There is no history of pyelonephritis. Associated symptoms include frequency, a possible pregnancy and urgency. Pertinent negatives include no chills, discharge, flank pain, hematuria, hesitancy, nausea, sweats or vomiting. She has tried nothing for the symptoms. Her past medical history is significant for recurrent UTIs. There is no history of kidney stones.        The following portions of the patient's history were reviewed and updated as appropriate: allergies, current medications, past medical history, past social history, past surgical history and problem list.    Review of Systems   Constitutional: Negative.  Negative for appetite change, chills and fever.   Respiratory: Negative.    Cardiovascular: Negative.    Gastrointestinal: Positive for abdominal pain. Negative for abdominal distention, diarrhea, nausea and vomiting.   Genitourinary: Positive for dysuria, frequency and urgency. Negative for difficulty urinating, flank pain, hematuria, hesitancy and vaginal discharge.   Musculoskeletal: Negative.  Negative for back pain.   Skin: Negative.    Neurological: Negative.    Psychiatric/Behavioral: Negative.         /80   Pulse 91   Temp 97.5 °F (36.4 °C)   Resp 15   Ht 162.6 cm (64\")   Wt 89.6 kg (197 lb 9.6 oz)   LMP 09/04/2019   SpO2 99%   BMI 33.92 kg/m²      Objective   Physical Exam   Constitutional: She is oriented to person, place, and time. Vital signs are normal. She appears well-developed and well-nourished. No distress.   Cardiovascular: Normal rate, regular rhythm, S1 normal, S2 normal and normal heart sounds.   Pulmonary/Chest: Effort normal and breath sounds normal. No " respiratory distress. She has no wheezes.   Abdominal: Soft. Normal appearance and bowel sounds are normal. She exhibits no distension. There is no hepatosplenomegaly. There is tenderness in the right lower quadrant. There is no rebound, no guarding and no CVA tenderness.   Neurological: She is alert and oriented to person, place, and time.   Skin: Skin is warm, dry and intact.   Psychiatric: She has a normal mood and affect. Her behavior is normal. Thought content normal.   Vitals reviewed.       Results for orders placed or performed in visit on 09/19/19   POC Urinalysis Dipstick, Automated   Result Value Ref Range    Color Dark Yellow Yellow, Straw, Dark Yellow, Abril    Clarity, UA Cloudy (A) Clear    Specific Gravity  1.025 1.005 - 1.030    pH, Urine 6.5 5.0 - 8.0    Leukocytes Small (1+) (A) Negative    Nitrite, UA Negative Negative    Protein, POC Negative Negative mg/dL    Glucose, UA Negative Negative, 1000 mg/dL (3+) mg/dL    Ketones, UA Negative Negative    Urobilinogen, UA Normal Normal    Bilirubin Negative Negative    Blood, UA Trace (A) Negative   POC Pregnancy, Urine   Result Value Ref Range    HCG, Urine, QL Negative Negative    Lot Number [id number]     Internal Positive Control Positive     Internal Negative Control NA         Assessment/Plan   Janina was seen today for urinary tract infection.    Diagnoses and all orders for this visit:    Acute UTI  -     POC Urinalysis Dipstick, Automated  -     Urine Culture - Urine, Urine, Clean Catch  -     phenazopyridine (PYRIDIUM) 200 MG tablet; Take 1 tablet by mouth 3 (Three) Times a Day As Needed for bladder spasms for up to 2 days.  -     nitrofurantoin, macrocrystal-monohydrate, (MACROBID) 100 MG capsule; Take 1 capsule by mouth Every 12 (Twelve) Hours for 7 days.    Nausea  -     ondansetron ODT (ZOFRAN ODT) 4 MG disintegrating tablet; Take 1 tablet by mouth Every 8 (Eight) Hours As Needed for Nausea or Vomiting for up to 5 days.    Pregnancy test  negative  -     POC Pregnancy, Urine

## 2019-09-22 ENCOUNTER — TELEPHONE (OUTPATIENT)
Dept: RETAIL CLINIC | Facility: CLINIC | Age: 29
End: 2019-09-22

## 2019-09-22 LAB
BACTERIA UR CULT: ABNORMAL
BACTERIA UR CULT: ABNORMAL
OTHER ANTIBIOTIC SUSC ISLT: ABNORMAL

## 2020-02-16 ENCOUNTER — OFFICE VISIT (OUTPATIENT)
Dept: RETAIL CLINIC | Facility: CLINIC | Age: 30
End: 2020-02-16

## 2020-02-16 VITALS
DIASTOLIC BLOOD PRESSURE: 78 MMHG | SYSTOLIC BLOOD PRESSURE: 116 MMHG | TEMPERATURE: 99.6 F | RESPIRATION RATE: 15 BRPM | BODY MASS INDEX: 35.03 KG/M2 | HEART RATE: 94 BPM | OXYGEN SATURATION: 99 % | HEIGHT: 64 IN | WEIGHT: 205.2 LBS

## 2020-02-16 DIAGNOSIS — J04.0 LARYNGITIS: Primary | ICD-10-CM

## 2020-02-16 DIAGNOSIS — J06.9 VIRAL UPPER RESPIRATORY TRACT INFECTION: ICD-10-CM

## 2020-02-16 DIAGNOSIS — Z87.09 HISTORY OF ASTHMA: ICD-10-CM

## 2020-02-16 LAB
EXPIRATION DATE: NORMAL
EXPIRATION DATE: NORMAL
FLUAV AG NPH QL: NEGATIVE
FLUBV AG NPH QL: NEGATIVE
INTERNAL CONTROL: NORMAL
INTERNAL CONTROL: NORMAL
Lab: NORMAL
Lab: NORMAL
S PYO AG THROAT QL: NEGATIVE

## 2020-02-16 PROCEDURE — 99213 OFFICE O/P EST LOW 20 MIN: CPT | Performed by: NURSE PRACTITIONER

## 2020-02-16 PROCEDURE — 87880 STREP A ASSAY W/OPTIC: CPT | Performed by: NURSE PRACTITIONER

## 2020-02-16 PROCEDURE — 87804 INFLUENZA ASSAY W/OPTIC: CPT | Performed by: NURSE PRACTITIONER

## 2020-02-16 RX ORDER — PREDNISONE 20 MG/1
20 TABLET ORAL DAILY
Qty: 5 TABLET | Refills: 0 | Status: SHIPPED | OUTPATIENT
Start: 2020-02-16 | End: 2020-02-21

## 2020-02-16 RX ORDER — ALBUTEROL SULFATE 90 UG/1
AEROSOL, METERED RESPIRATORY (INHALATION)
Qty: 1 INHALER | Refills: 0 | Status: SHIPPED | OUTPATIENT
Start: 2020-02-16

## 2020-02-16 RX ORDER — DEXTROMETHORPHAN HYDROBROMIDE AND PROMETHAZINE HYDROCHLORIDE 15; 6.25 MG/5ML; MG/5ML
5 SYRUP ORAL 4 TIMES DAILY PRN
Qty: 120 ML | Refills: 0 | Status: SHIPPED | OUTPATIENT
Start: 2020-02-16 | End: 2020-02-21

## 2020-02-16 NOTE — PROGRESS NOTES
"Alayna Santana is a 29 y.o. female.   /78   Pulse 94   Temp 99.6 °F (37.6 °C)   Resp 15   Ht 162.6 cm (64\")   Wt 93.1 kg (205 lb 3.2 oz)   LMP 02/08/2020   SpO2 99%   BMI 35.22 kg/m²   Past Medical History:   Diagnosis Date   • Allergic    • Asthma    • Gallbladder abscess      Allergies   Allergen Reactions   • Sulfa Antibiotics Rash   • Tramadol Swelling         URI    This is a new problem. Episode onset: past 2-3 days. The problem has been gradually worsening. Maximum temperature: subjective, low grade. Associated symptoms include congestion, coughing, nausea (mild), rhinorrhea and a sore throat. Pertinent negatives include no abdominal pain, diarrhea, dysuria, headaches, joint pain, joint swelling, neck pain, plugged ear sensation, rash, sinus pain, sneezing, vomiting or wheezing.        The following portions of the patient's history were reviewed and updated as appropriate: allergies, current medications, past family history, past medical history, past social history, past surgical history and problem list.    Review of Systems   HENT: Positive for congestion, rhinorrhea and sore throat. Negative for sinus pain and sneezing.    Respiratory: Positive for cough. Negative for wheezing.    Gastrointestinal: Positive for nausea (mild). Negative for abdominal pain, diarrhea and vomiting.   Genitourinary: Negative for dysuria.   Musculoskeletal: Negative for joint pain and neck pain.   Skin: Negative for rash.   Neurological: Negative for headaches.       Objective   Physical Exam   Constitutional: She appears well-developed and well-nourished.  Non-toxic appearance. She appears ill (mild).   HENT:   Head: Normocephalic and atraumatic.   Right Ear: Tympanic membrane and ear canal normal.   Left Ear: Tympanic membrane and ear canal normal.   Nose: Mucosal edema and rhinorrhea (clear) present.   Mouth/Throat: Uvula is midline. Posterior oropharyngeal erythema present.   Cardiovascular: Regular " rhythm and normal heart sounds.   Pulmonary/Chest: Effort normal. She has no wheezes. She has no rhonchi. She has no rales.   Hoarse voice noted   Lymphadenopathy:     She has no cervical adenopathy.   Skin: Skin is warm and dry.       Assessment/Plan   Janina was seen today for uri.    Diagnoses and all orders for this visit:    Laryngitis  -     POC Influenza A / B    Viral upper respiratory tract infection  -     POC Rapid Strep A    History of asthma    Other orders  -     albuterol sulfate  (90 Base) MCG/ACT inhaler; 1-2 puffs q 4-6 hours prn for SOB or wheeze  -     predniSONE (DELTASONE) 20 MG tablet; Take 1 tablet by mouth Daily for 5 days.  -     promethazine-dextromethorphan (PROMETHAZINE-DM) 6.25-15 MG/5ML syrup; Take 5 mL by mouth 4 (Four) Times a Day As Needed for Cough for up to 5 days.        Results for orders placed or performed in visit on 02/16/20   POC Rapid Strep A   Result Value Ref Range    Rapid Strep A Screen Negative Negative, VALID, INVALID, Not Performed    Internal Control Passed Passed    Lot Number [id number]     Expiration Date 3,312,021    POC Influenza A / B   Result Value Ref Range    Rapid Influenza A Ag Negative Negative    Rapid Influenza B Ag Negative Negative    Internal Control Passed Passed    Lot Number [id number]     Expiration Date 5,062,022

## 2020-12-20 ENCOUNTER — TELEMEDICINE (OUTPATIENT)
Dept: FAMILY MEDICINE CLINIC | Facility: TELEHEALTH | Age: 30
End: 2020-12-20

## 2020-12-20 VITALS — TEMPERATURE: 96.7 F

## 2020-12-20 DIAGNOSIS — J32.9 SINUSITIS, UNSPECIFIED CHRONICITY, UNSPECIFIED LOCATION: Primary | ICD-10-CM

## 2020-12-20 DIAGNOSIS — J45.909 ASTHMA, UNSPECIFIED ASTHMA SEVERITY, UNSPECIFIED WHETHER COMPLICATED, UNSPECIFIED WHETHER PERSISTENT: ICD-10-CM

## 2020-12-20 PROCEDURE — 99213 OFFICE O/P EST LOW 20 MIN: CPT | Performed by: NURSE PRACTITIONER

## 2020-12-20 RX ORDER — METHYLPREDNISOLONE 4 MG/1
TABLET ORAL
Qty: 1 EACH | Refills: 0 | Status: SHIPPED | OUTPATIENT
Start: 2020-12-20

## 2020-12-20 RX ORDER — AMOXICILLIN AND CLAVULANATE POTASSIUM 875; 125 MG/1; MG/1
1 TABLET, FILM COATED ORAL 2 TIMES DAILY
Qty: 14 TABLET | Refills: 0 | Status: SHIPPED | OUTPATIENT
Start: 2020-12-20 | End: 2020-12-27

## 2020-12-20 RX ORDER — ALBUTEROL SULFATE 90 UG/1
2 AEROSOL, METERED RESPIRATORY (INHALATION) EVERY 4 HOURS PRN
Qty: 18 G | Refills: 0 | Status: SHIPPED | OUTPATIENT
Start: 2020-12-20 | End: 2021-01-19

## 2020-12-20 NOTE — PROGRESS NOTES
Janina Santana  1990    Chief Complaint   Patient presents with   • Nasal Congestion   • URI     Asthma   • Cough   • Sore Throat       HPI  Janina Santana is a 30 y.o. female with complaints of chest congestion, dry cough, headache, hoarseness, nasal blockage, pain while swallowing, post nasal drip, sinus and nasal congestion, left sided sinus pain, sore throat and wheezing. Symptoms began 5 days ago. Associated symptoms include facial pain bilaterally but worse on the left, headache that has been frontally and worsening and chest tightness with cough that has worsened. Postnasal drainage has worsened and she has felt nauseated, possibly from the drainage. She has Asthma and has not required treatment except when she is sick.. Patient denies chills, myalgias and sweats. Patient has a history of asthma. Patient does smoke cigarettes. She reports that her son had similar symptoms but mostly sore throat, not Strep. She has had no known exposure to Covid.      The following portions of the patient's history were reviewed and updated as appropriate: allergies, current medications, past social history and problem list.    Past Medical History:   Diagnosis Date   • Allergic    • Asthma    • Gallbladder abscess      Social History     Socioeconomic History   • Marital status:      Spouse name: Not on file   • Number of children: Not on file   • Years of education: Not on file   • Highest education level: Not on file   Tobacco Use   • Smoking status: Current Every Day Smoker     Packs/day: 0.50     Types: Cigarettes   Substance and Sexual Activity   • Alcohol use: No   • Drug use: No       REVIEW OF SYSTEMS  History obtained from the patient  General ROS: positive for  - fatigue and sleep disturbance  negative for - chills or fever  ENT ROS: positive for - headaches, nasal congestion, sinus pain, sore throat and vocal changes  negative for - epistaxis, hearing change, tinnitus, vertigo or visual changes  Allergy and  Immunology ROS: positive for - nasal congestion, postnasal drip and seasonal allergies  negative for - itchy/watery eyes  Respiratory ROS: positive for - cough and wheezing  negative for - pleuritic pain, shortness of breath or sputum changes    PHYSICAL EXAM  Temp 96.7 °F (35.9 °C) (Oral)   LMP  (Within Weeks)     CONSTITUTIONAL:alert, hoarse and ill appearing but in no distress  ENT:pharynx erythematous without exudate, left maxillary sinus tenderness, and nasal mucosa congested bilaterally, bilateral tonsillar LN tenderness  CHEST:respiratory effort normal  PSYCH:alert, normal affect and speech     Diagnoses and all orders for this visit:    1. Sinusitis, unspecified chronicity, unspecified location (Primary)  -     amoxicillin-clavulanate (AUGMENTIN) 875-125 MG per tablet; Take 1 tablet by mouth 2 (Two) Times a Day for 7 days.  Dispense: 14 tablet; Refill: 0    2. Asthma, unspecified asthma severity, unspecified whether complicated, unspecified whether persistent  -     albuterol sulfate  (90 Base) MCG/ACT inhaler; Inhale 2 puffs Every 4 (Four) Hours As Needed for Wheezing or Shortness of Air for up to 30 days.  Dispense: 18 g; Refill: 0  -     methylPREDNISolone (MEDROL) 4 MG dose pack; Take as directed on package instructions.  Dispense: 1 each; Refill: 0        [address] - [address] [place] [address] [phone] [place] [id number] [address] [address]  Phone: [phone] Fax: [phone]      This visit was performed via Telehealth.  Take antibiotic until gone. May take probiotic with antibiotic if prone to antibiotic induced diarrhea. Start Claritin, Zyrtec or Allegra daily. Cool mist humidifier at bedside. Follow up with PCP, Urgent Care or Video Visit if symptoms have not resolved in 7-10 days. If symptoms worse, go to Urgent Care or follow up with PCP. If you develop high fever, chest pain, shortness of breath or any life-threatening symptoms, go to nearest  Emergency Department.           Toyin Dao, APRN  12/20/20  02:58 EST

## 2020-12-20 NOTE — PATIENT INSTRUCTIONS
Take antibiotic until gone. May take probiotic with antibiotic if prone to antibiotic induced diarrhea. Start Claritin, Zyrtec or Allegra daily. Cool mist humidifier at bedside. Follow up with PCP, Urgent Care or Video Visit if symptoms have not resolved in 7-10 days. If symptoms worse, go to Urgent Care or follow up with PCP. If you develop high fever, chest pain, shortness of breath or any life-threatening symptoms, go to nearest Emergency Department.    Sinusitis, Adult  Sinusitis is soreness and swelling (inflammation) of your sinuses. Sinuses are hollow spaces in the bones around your face. They are located:  · Around your eyes.  · In the middle of your forehead.  · Behind your nose.  · In your cheekbones.  Your sinuses and nasal passages are lined with a fluid called mucus. Mucus drains out of your sinuses. Swelling can trap mucus in your sinuses. This lets germs (bacteria, virus, or fungus) grow, which leads to infection. Most of the time, this condition is caused by a virus.  What are the causes?  This condition is caused by:  · Allergies.  · Asthma.  · Germs.  · Things that block your nose or sinuses.  · Growths in the nose (nasal polyps).  · Chemicals or irritants in the air.  · Fungus (rare).  What increases the risk?  You are more likely to develop this condition if:  · You have a weak body defense system (immune system).  · You do a lot of swimming or diving.  · You use nasal sprays too much.  · You smoke.  What are the signs or symptoms?  The main symptoms of this condition are pain and a feeling of pressure around the sinuses. Other symptoms include:  · Stuffy nose (congestion).  · Runny nose (drainage).  · Swelling and warmth in the sinuses.  · Headache.  · Toothache.  · A cough that may get worse at night.  · Mucus that collects in the throat or the back of the nose (postnasal drip).  · Being unable to smell and taste.  · Being very tired (fatigue).  · A fever.  · Sore throat.  · Bad breath.  How is  this diagnosed?  This condition is diagnosed based on:  · Your symptoms.  · Your medical history.  · A physical exam.  · Tests to find out if your condition is short-term (acute) or long-term (chronic). Your doctor may:  ? Check your nose for growths (polyps).  ? Check your sinuses using a tool that has a light (endoscope).  ? Check for allergies or germs.  ? Do imaging tests, such as an MRI or CT scan.  How is this treated?  Treatment for this condition depends on the cause and whether it is short-term or long-term.  · If caused by a virus, your symptoms should go away on their own within 10 days. You may be given medicines to relieve symptoms. They include:  ? Medicines that shrink swollen tissue in the nose.  ? Medicines that treat allergies (antihistamines).  ? A spray that treats swelling of the nostrils.   ? Rinses that help get rid of thick mucus in your nose (nasal saline washes).  · If caused by bacteria, your doctor may wait to see if you will get better without treatment. You may be given antibiotic medicine if you have:  ? A very bad infection.  ? A weak body defense system.  · If caused by growths in the nose, you may need to have surgery.  Follow these instructions at home:  Medicines  · Take, use, or apply over-the-counter and prescription medicines only as told by your doctor. These may include nasal sprays.  · If you were prescribed an antibiotic medicine, take it as told by your doctor. Do not stop taking the antibiotic even if you start to feel better.  Hydrate and humidify    · Drink enough water to keep your pee (urine) pale yellow.  · Use a cool mist humidifier to keep the humidity level in your home above 50%.  · Breathe in steam for 10-15 minutes, 3-4 times a day, or as told by your doctor. You can do this in the bathroom while a hot shower is running.  · Try not to spend time in cool or dry air.  Rest  · Rest as much as you can.  · Sleep with your head raised (elevated).  · Make sure you get  "enough sleep each night.  General instructions    · Put a warm, moist washcloth on your face 3-4 times a day, or as often as told by your doctor. This will help with discomfort.  · Wash your hands often with soap and water. If there is no soap and water, use hand .  · Do not smoke. Avoid being around people who are smoking (secondhand smoke).  · Keep all follow-up visits as told by your doctor. This is important.  Contact a doctor if:  · You have a fever.  · Your symptoms get worse.  · Your symptoms do not get better within 10 days.  Get help right away if:  · You have a very bad headache.  · You cannot stop throwing up (vomiting).  · You have very bad pain or swelling around your face or eyes.  · You have trouble seeing.  · You feel confused.  · Your neck is stiff.  · You have trouble breathing.  Summary  · Sinusitis is swelling of your sinuses. Sinuses are hollow spaces in the bones around your face.  · This condition is caused by tissues in your nose that become inflamed or swollen. This traps germs. These can lead to infection.  · If you were prescribed an antibiotic medicine, take it as told by your doctor. Do not stop taking it even if you start to feel better.  · Keep all follow-up visits as told by your doctor. This is important.  This information is not intended to replace advice given to you by your health care provider. Make sure you discuss any questions you have with your health care provider.  Document Revised: 05/20/2019 Document Reviewed: 05/20/2019  [employer] Patient Education © 2020 [employer]    http://www.aaaai.org/conditions-and-treatments/asthma\">   Asthma, Adult    Asthma is a long-term (chronic) condition that causes recurrent episodes in which the airways become tight and narrow. The airways are the passages that lead from the nose and mouth down into the lungs. Asthma episodes, also called asthma attacks, can cause coughing, wheezing, shortness of breath, and chest pain. The " airways can also fill with mucus. During an attack, it can be difficult to breathe. Asthma attacks can range from minor to life threatening.  Asthma cannot be cured, but medicines and lifestyle changes can help control it and treat acute attacks.  What are the causes?  This condition is believed to be caused by inherited (genetic) and environmental factors, but its exact cause is not known.  There are many things that can bring on an asthma attack or make asthma symptoms worse (triggers). Asthma triggers are different for each person. Common triggers include:  · Mold.  · Dust.  · Cigarette smoke.  · Cockroaches.  · Things that can cause allergy symptoms (allergens), such as animal dander or pollen from trees or grass.  · Air pollutants such as household , wood smoke, smog, or chemical odors.  · Cold air, weather changes, and winds (which increase molds and pollen in the air).  · Strong emotional expressions such as crying or laughing hard.  · Stress.  · Certain medicines (such as aspirin) or types of medicines (such as beta-blockers).  · Sulfites in foods and drinks. Foods and drinks that may contain sulfites include dried fruit, potato chips, and sparkling grape juice.  · Infections or inflammatory conditions such as the flu, a cold, or inflammation of the nasal membranes (rhinitis).  · Gastroesophageal reflux disease (GERD).  · Exercise or strenuous activity.  What are the signs or symptoms?  Symptoms of this condition may occur right after asthma is triggered or many hours later. Symptoms include:  · Wheezing. This can sound like whistling when you breathe.  · Excessive nighttime or early morning coughing.  · Frequent or severe coughing with a common cold.  · Chest tightness.  · Shortness of breath.  · Tiredness (fatigue) with minimal activity.  How is this diagnosed?  This condition is diagnosed based on:  · Your medical history.  · A physical exam.  · Tests, which may include:  ? Lung function studies  and pulmonary studies (spirometry). These tests can evaluate the flow of air in your lungs.  ? Allergy tests.  ? Imaging tests, such as X-rays.  How is this treated?  There is no cure for this condition, but treatment can help control your symptoms. Treatment for asthma usually involves:  · Identifying and avoiding your asthma triggers.  · Using medicines to control your symptoms. Generally, two types of medicines are used to treat asthma:  ? Controller medicines. These help prevent asthma symptoms from occurring. They are usually taken every day.  ? Fast-acting reliever or rescue medicines. These quickly relieve asthma symptoms by widening the narrow and tight airways. They are used as needed and provide short-term relief.  · Using supplemental oxygen. This may be needed during a severe episode.  · Using other medicines, such as:  ? Allergy medicines, such as antihistamines, if your asthma attacks are triggered by allergens.  ? Immune medicines (immunomodulators). These are medicines that help control the immune system.  · Creating an asthma action plan. An asthma action plan is a written plan for managing and treating your asthma attacks. This plan includes:  ? A list of your asthma triggers and how to avoid them.  ? Information about when medicines should be taken and when their dosage should be changed.  ? Instructions about using a device called a peak flow meter. A peak flow meter measures how well the lungs are working and the severity of your asthma. It helps you monitor your condition.  Follow these instructions at home:  Controlling your home environment  Control your home environment in the following ways to help avoid triggers and prevent asthma attacks:  · Change your heating and air conditioning filter regularly.  · Limit your use of fireplaces and wood stoves.  · Get rid of pests (such as roaches and mice) and their droppings.  · Throw away plants if you see mold on them.  · Clean floors and dust  surfaces regularly. Use unscented cleaning products.  · Try to have someone else vacuum for you regularly. Stay out of rooms while they are being vacuumed and for a short while afterward. If you vacuum, use a dust mask from a hardware store, a double-layered or microfilter vacuum  bag, or a vacuum  with a HEPA filter.  · Replace carpet with wood, tile, or vinyl lilian. Carpet can trap dander and dust.  · Use allergy-proof pillows, mattress covers, and box spring covers.  · Keep your bedroom a trigger-free room.  · Avoid pets and keep windows closed when allergens are in the air.  · Wash beddings every week in hot water and dry them in a dryer.  · Use blankets that are made of polyester or cotton.  · Clean bathrooms and lino with bleach. If possible, have someone repaint the walls in these rooms with mold-resistant paint. Stay out of the rooms that are being cleaned and painted.  · Wash your hands often with soap and water. If soap and water are not available, use hand .  · Do not allow anyone to smoke in your home.  General instructions  · Take over-the-counter and prescription medicines only as told by your health care provider.  ? Speak with your health care provider if you have questions about how or when to take the medicines.  ? Make note if you are requiring more frequent dosages.  · Do not use any products that contain nicotine or tobacco, such as cigarettes and e-cigarettes. If you need help quitting, ask your health care provider. Also, avoid being exposed to secondhand smoke.  · Use a peak flow meter as told by your health care provider. Record and keep track of the readings.  · Understand and use the asthma action plan to help minimize, or stop an asthma attack, without needing to seek medical care.  · Make sure you stay up to date on your yearly vaccinations as told by your health care provider. This may include vaccines for the flu and pneumonia.  · Avoid outdoor activities  when allergen counts are high and when air quality is low.  · Wear a ski mask that covers your nose and mouth during outdoor winter activities. Exercise indoors on cold days if you can.  · Warm up before exercising, and take time for a cool-down period after exercise.  · Keep all follow-up visits as told by your health care provider. This is important.  Where to find more information  · For information about asthma, turn to the Centers for Disease Control and Prevention at www.cdc.gov/asthma/faqs.htm  · For air quality information, turn to [employer] at https://airnow.gov/  Contact a health care provider if:  · You have wheezing, shortness of breath, or a cough even while you are taking medicine to prevent attacks.  · The mucus you cough up (sputum) is thicker than usual.  · Your sputum changes from clear or white to yellow, green, gray, or bloody.  · Your medicines are causing side effects, such as a rash, itching, swelling, or trouble breathing.  · You need to use a reliever medicine more than 2-3 times a week.  · Your peak flow reading is still at 50-79% of your personal best after following your action plan for 1 hour.  · You have a fever.  Get help right away if:  · You are getting worse and do not respond to treatment during an asthma attack.  · You are short of breath when at rest or when doing very little physical activity.  · You have difficulty eating, drinking, or talking.  · You have chest pain or tightness.  · You develop a fast heartbeat or palpitations.  · You have a bluish color to your lips or fingernails.  · You are light-headed or dizzy, or you faint.  · Your peak flow reading is less than 50% of your personal best.  · You feel too tired to breathe normally.  Summary  · Asthma is a long-term (chronic) condition that causes recurrent episodes in which the airways become tight and narrow. These episodes can cause coughing, wheezing, shortness of breath, and chest pain.  · Asthma cannot be cured, but  medicines and lifestyle changes can help control it and treat acute attacks.  · Make sure you understand how to avoid triggers and how and when to use your medicines.  · Asthma attacks can range from minor to life threatening. Get help right away if you have an asthma attack and do not respond to treatment with your usual rescue medicines.  This information is not intended to replace advice given to you by your health care provider. Make sure you discuss any questions you have with your health care provider.  Document Revised: 02/20/2020 Document Reviewed: 01/22/2018  [employer] Patient Education © 2020 [employer] Inc.

## 2024-09-15 ENCOUNTER — HOSPITAL ENCOUNTER (EMERGENCY)
Age: 34
Discharge: HOME | End: 2024-09-15
Payer: COMMERCIAL

## 2024-09-15 VITALS
HEART RATE: 74 BPM | TEMPERATURE: 98.42 F | OXYGEN SATURATION: 100 % | RESPIRATION RATE: 19 BRPM | SYSTOLIC BLOOD PRESSURE: 128 MMHG | DIASTOLIC BLOOD PRESSURE: 83 MMHG

## 2024-09-15 VITALS
HEART RATE: 74 BPM | SYSTOLIC BLOOD PRESSURE: 128 MMHG | RESPIRATION RATE: 19 BRPM | DIASTOLIC BLOOD PRESSURE: 83 MMHG | OXYGEN SATURATION: 100 % | TEMPERATURE: 98.42 F

## 2024-09-15 VITALS — BODY MASS INDEX: 37.8 KG/M2

## 2024-09-15 DIAGNOSIS — M79.602: ICD-10-CM

## 2024-09-15 DIAGNOSIS — M32.9: ICD-10-CM

## 2024-09-15 DIAGNOSIS — M25.541: Primary | ICD-10-CM

## 2024-09-15 DIAGNOSIS — M79.601: ICD-10-CM

## 2024-09-15 PROCEDURE — 99204 OFFICE O/P NEW MOD 45 MIN: CPT

## 2024-09-15 PROCEDURE — G0463 HOSPITAL OUTPT CLINIC VISIT: HCPCS

## 2024-09-15 PROCEDURE — 99212 OFFICE O/P EST SF 10 MIN: CPT

## 2024-09-15 PROCEDURE — 81025 URINE PREGNANCY TEST: CPT

## 2024-09-15 NOTE — ED_ITS
Discharge Plan    
Disposition    
Patient Disposition: Home, Self-Care    
    
Condition: Good    
    
Prescriptions    
Prescriptions:    
New    
  methylprednisolone [Medrol (Anil)] 4 mg tablets,dose pack     
   See Rx Instructions  .Route .COMPLEX 6 Days Qty: 21 0RF    
   Rx Instructions:    
   taper pack;     
    
No Action    
  albuterol sulfate 90 mcg/actuation Hfa Aerosol Inhaler     
   2 puff INHALATION Q6H PRN (Reason: Asthma)     
    
Referrals    
Follow up/Referrals:    
Provider,Referral, MD [Primary Care Provider] - See instructions    
    
Activity Restrictions/Add. Instructions    
Additional Instructions/Restrictions:    
Take medication as prescribed    
Follow up with your Family Doctor if no improvement or any worsening of symptoms    
Get your eye exam and start your medicatoin as prescribed    
Straight to ER if any life threatening symptoms    
    
Clinical Impressions    
Clinical Impression:    
Hand joint pain    
Qualifiers:    
 Laterality: bilateral Qualified Code(s): M25.541 - Pain in joints of right hand    
    
    
Instructions    
Patient Instructions:  Lupus (Alternative Therapy), DI for Systemic Lupus   
Erythematosus, Methylprednisolone    
    
Print Language    
Print Language: English    
    
Discharge    
ED Provider: Gladis Kathleen    
    
    
[facility] HPI    
General    
Stated complaint: weakness, bilateral finger tingling/cramping    
Mode of Arrival: Ambulatory    
Source of Information: Patient    
Limitations: No Limitations    
Time Seen by Provider: 09/15/24 15:09    
Description of Symptoms (Recalled from Triage Doc. by RN): PATIENT C/O PAIN TO   
BILATERAL ARMS AND HANDS AND STATES THAT THEY DON'T  FUNCTION CORRECTLY .   
PATIENT STATES SHE THINKS SHE IS HAVING A LUPUS FLARE-UP OR HAS PINCHED A NERVE    
HEENT Symptoms (Recalled from RN notes): No    
Resp Symptoms (Recalled from RN notes): No    
Skin Symptoms (Recalled from RN notes): No    
MS Symptoms (Recalled from RN notes): Yes    
Functional Status (Recalled from RN notes): WNL    
History of Present Illness    
Provider Complaint: Patient states that she has a hx of lupus States she has   
been doing alot of heavy lifting, push, pulling and tugging while moving last   
week and for the last couple of days she has been having bilateral arm pain and   
pain in her hands when she picks stuff up States not sure if she may be having a  
lupus flare or pinched a nerve in her back during the move    
Related Data    
                                Home Medications    
    
    
    
?Medication ?Instructions ?Recorded ?Confirmed    
     
albuterol sulfate 90 mcg/actuation 2 puff inhalation Q6H PRN Asthma 09/15/24   
09/15/24    
    
aerosol inhaler       
    
    
                                  Previous Rx's    
    
    
    
?Medication ?Instructions ?Recorded    
     
methylprednisolone 4 mg tablets in See Rx Instructions .Route 09/15/24    
    
a dose pack (Medrol (Anil)) .COMPLEX 6 days #21 tabs     
    
    
    
                                    Allergies    
    
    
    
Allergy/AdvReac Type Severity Reaction Status Date / Time    
     
sulfamethoxazole Allergy  Swelling Verified 09/15/24 14:48    
    
[From Bactrim]   of      
    
   Lip/Tongue/Throat      
     
tramadol Allergy  Swelling Verified 09/15/24 14:48    
    
   of      
    
   Lip/Tongue/Throat      
     
trimethoprim [From Bactrim] Allergy  Swelling Verified 09/15/24 14:48    
    
   of      
    
   Lip/Tongue/Throat      
    
    
    
Worker's Comp    
Is this a Worker's Comp case?: No    
    
[facility]    
Disclaimer:     
The information contained in this section may have been updated after the   
patient was seen, as this information can be updated by other users.    
    
Medical History (Updated 09/15/24 @ 15:28 by ILSA Ramirez)    
    
Lupus    
Urinary tract infection    
Asthma    
    
    
Surgical History (Updated 09/15/24 @ 14:48 by Karla Geronimo RN)    
    
History of cholecystectomy    
    
    
Social History    
Smoking Status:  Unknown if ever smoked     
alcohol intake:  never     
current occupational status:  unemployed     
Travel in the last 8 weeks:  None     
    
    
    
ROS Obtained: Yes All systems reviewed & no additional complaints except as   
documented and Yes Systems reviewed as appropriate & no additional complaints   
except as documented    
Constitutional    
Constitutional: Reports system reviewed and no additional complaints, except as   
documented, Reports as per HPI, Denies body ache, Denies chills, Denies fever(s)  
and Denies headache(s)    
ENT    
Ears, Nose, Mouth, and Throat: Reports system reviewed and no additional   
complaints, except as documented, Reports as per HPI and Denies headache(s)    
Cardiovascular    
Cardiovascular: Reports system reviewed and no additional complaints, except as   
documented and Reports as per HPI    
Respiratory    
Respiratory: Reports system reviewed and no additional complaints, except as   
documented and Reports as per HPI    
Gastrointestinal    
Gastrointestingal: Reports system reviewed and no additional complaints, except   
as documented and as per HPI    
Musculoskeletal    
Musculoskeletal: Reports system reviewed and no additional complaints, except as  
documented and Reports as per HPI    
Comments:     
pain bilateral arms and pain in hands hurts when she tries to  stuff or   
 something     
Neurologic    
Neurologic: Denies headache(s)    
    
Physical Exam    
General    
General appearance: alert and in no apparent distress    
ENT    
ENT exam: Present mucous membranes moist    
Respiratory    
Respiratory exam: Present normal lung sounds bilaterally; Absent respiratory   
distress or wheezes    
Cardiovascular    
Cardiovascular exam: Present regular rate, normal rhythm and normal heart sounds    
Abdominal Exam    
Abdominal exam: Present soft and normal bowel sounds; Absent distention or   
tenderness    
Extremities Exam    
Extremities exam: Present normal capillary refill and other (report pain   
bilateral arms from elbows to fingertips, worse when she tries to    
something); Absent edema, joint swelling or cyanosis    
Neurological Exam    
Neurological exam: Present alert, oriented X3 and normal gait    
    
Medical Decision Making    
Chang Inquiry    
Pt receiving controlled substance: No    
Chang was queried for this patient: No    
Vital Signs:     
    
                                            
    
    
    
 09/15/24    
14:35    
     
Temperature 98.5 F    
     
Temperature Source Oral    
     
Pulse Rate [Left] 74    
     
Respiratory Rate 19    
     
Blood Pressure [Left Arm] 128/83    
     
Blood Pressure Mean [Left Arm] 98    
     
Blood Pressure Source [Left Arm] Automatic Cuff    
     
Blood Pressure Position [Left Arm] Sitting    
     
02 Sat by Pulse Oximetry 100    
     
Oxygen Delivery Method Room Air    
    
    
    
    
Medical Decision Narrative:     
Patient reports has a hx of lupus and has been under stress moving, states she   
did alot of heavy lifting push pulling and tugging while moving this week and   
for the last two nights she has been having pain in bilateral arms from elbows   
to finger Recently was prescribed medication to treat her lupus but she hasnt   
started it yet awaiting to get eye exam first suspect lupus flare due to recent   
stress of moving and doing alot of heavy lifting will prescribe Medrol dose pack  
and have patient follow up with PCP if no improvement Denies injury to back, no   
tenderness with palpation and pain in hands worse when trying  something

## 2024-09-15 NOTE — EXP.UTC
Discharge Plan
Disposition
Patient Disposition: Home, Self-Care

Condition: Good

Prescriptions
Prescriptions:
New
  methylprednisolone [Medrol (Anil)] 4 mg tablets,dose pack 
   See Rx Instructions  .Route .COMPLEX 6 Days Qty: 21 0RF
   Rx Instructions:
   taper pack; 

No Action
  albuterol sulfate 90 mcg/actuation Hfa Aerosol Inhaler 
   2 puff INHALATION Q6H PRN (Reason: Asthma) 

Referrals
Follow up/Referrals:
Provider,Referral, MD [Primary Care Provider] - See instructions

Activity Restrictions/Add. Instructions
Additional Instructions/Restrictions:
Take medication as prescribed
Follow up with your Family Doctor if no improvement or any worsening of symptoms
Get your eye exam and start your medicatoin as prescribed
Straight to ER if any life threatening symptoms

Clinical Impressions
Clinical Impression:
Hand joint pain
Qualifiers:
 Laterality: bilateral Qualified Code(s): M25.541 - Pain in joints of right hand


Instructions
Patient Instructions:  Lupus (Alternative Therapy), DI for Systemic Lupus Erythematosus, Methylprednisolone

Print Language
Print Language: English

Discharge
ED Provider: Gladis Kathleen


[facility] [place] HPI
General
Stated complaint: weakness, bilateral finger tingling/cramping
Mode of Arrival: Ambulatory
Source of Information: Patient
Limitations: No Limitations
Time Seen by Provider: 09/15/24 15:09
Description of Symptoms (Recalled from Triage Doc. by RN): PATIENT C/O PAIN TO BILATERAL ARMS AND HANDS AND STATES THAT THEY DON'T  FUNCTION CORRECTLY . PATIENT STATES SHE THINKS SHE IS HAVING A LUPUS FLARE-UP OR HAS PINCHED A NERVE
HEENT Symptoms (Recalled from RN notes): No
Resp Symptoms (Recalled from RN notes): No
Skin Symptoms (Recalled from RN notes): No
MS Symptoms (Recalled from RN notes): Yes
Functional Status (Recalled from RN notes): WNL
History of Present Illness
Provider Complaint: Patient states that she has a hx of lupus States she has been doing alot of heavy lifting, push, pulling and tugging while moving last week and for the last couple of days she has been having bilateral arm pain and pain in her 
hands when she picks stuff up States not sure if she may be having a lupus flare or pinched a nerve in her back during the move
Related Data
Home Medications

?Medication ?Instructions ?Recorded ?Confirmed
albuterol sulfate 90 mcg/actuation 2 puff inhalation Q6H PRN Asthma 09/15/24 09/15/24
aerosol inhaler   

Previous Rx's

?Medication ?Instructions ?Recorded
methylprednisolone 4 mg tablets in See Rx Instructions .Route 09/15/24
a dose pack (Medrol (Anil)) .COMPLEX 6 days #21 tabs 


Allergies

Allergy/AdvReac Type Severity Reaction Status Date / Time
sulfamethoxazole Allergy  Swelling Verified 09/15/24 14:48
[From Bactrim]   of  
   Lip/Tongue/Throat  
tramadol Allergy  Swelling Verified 09/15/24 14:48
   of  
   Lip/Tongue/Throat  
trimethoprim [From Bactrim] Allergy  Swelling Verified 09/15/24 14:48
   of  
   Lip/Tongue/Throat  


Worker's Comp
Is this a Worker's Comp case?: No

[facility]
Disclaimer: 
The information contained in this section may have been updated after the patient was seen, as this information can be updated by other users.

Medical History (Updated 09/15/24 @ 15:28 by ILSA Ramirez)

Lupus
Urinary tract infection
Asthma


Surgical History (Updated 09/15/24 @ 14:48 by Karla Geronimo RN)

History of cholecystectomy


Social History
Smoking Status:  Unknown if ever smoked 
alcohol intake:  never 
current occupational status:  unemployed 
Travel in the last 8 weeks:  None 



ROS Obtained: Yes All systems reviewed & no additional complaints except as documented and Yes Systems reviewed as appropriate & no additional complaints except as documented
Constitutional
Constitutional: Reports system reviewed and no additional complaints, except as documented, Reports as per HPI, Denies body ache, Denies chills, Denies fever(s) and Denies headache(s)
ENT
Ears, Nose, Mouth, and Throat: Reports system reviewed and no additional complaints, except as documented, Reports as per HPI and Denies headache(s)
Cardiovascular
Cardiovascular: Reports system reviewed and no additional complaints, except as documented and Reports as per HPI
Respiratory
Respiratory: Reports system reviewed and no additional complaints, except as documented and Reports as per HPI
Gastrointestinal
Gastrointestingal: Reports system reviewed and no additional complaints, except as documented and as per HPI
Musculoskeletal
Musculoskeletal: Reports system reviewed and no additional complaints, except as documented and Reports as per HPI
Comments: 
pain bilateral arms and pain in hands hurts when she tries to  stuff or  something 
Neurologic
Neurologic: Denies headache(s)

Physical Exam
General
General appearance: alert and in no apparent distress
ENT
ENT exam: Present mucous membranes moist
Respiratory
Respiratory exam: Present normal lung sounds bilaterally; Absent respiratory distress or wheezes
Cardiovascular
Cardiovascular exam: Present regular rate, normal rhythm and normal heart sounds
Abdominal Exam
Abdominal exam: Present soft and normal bowel sounds; Absent distention or tenderness
Extremities Exam
Extremities exam: Present normal capillary refill and other (report pain bilateral arms from elbows to fingertips, worse when she tries to  something); Absent edema, joint swelling or cyanosis
Neurological Exam
Neurological exam: Present alert, oriented X3 and normal gait

Medical Decision Making
Chang Inquiry
Pt receiving controlled substance: No
Chang was queried for this patient: No
Vital Signs: 



 09/15/24
14:35
Temperature 98.5 F
Temperature Source Oral
Pulse Rate [Left] 74
Respiratory Rate 19
Blood Pressure [Left Arm] 128/83
Blood Pressure Mean [Left Arm] 98
Blood Pressure Source [Left Arm] Automatic Cuff
Blood Pressure Position [Left Arm] Sitting
02 Sat by Pulse Oximetry 100
Oxygen Delivery Method Room Air



Medical Decision Narrative: 
Patient reports has a hx of lupus and has been under stress moving, states she did alot of heavy lifting push pulling and tugging while moving this week and for the last two nights she has been having pain in bilateral arms from elbows to finger 
Recently was prescribed medication to treat her lupus but she hasnt started it yet awaiting to get eye exam first suspect lupus flare due to recent stress of moving and doing alot of heavy lifting will prescribe Medrol dose pack and have patient 
follow up with PCP if no improvement Denies injury to back, no tenderness with palpation and pain in hands worse when trying  something

## 2024-09-26 ENCOUNTER — HOSPITAL ENCOUNTER (OUTPATIENT)
Dept: HOSPITAL 22 - RAD | Age: 34
Discharge: HOME | End: 2024-09-26
Payer: COMMERCIAL

## 2024-09-26 DIAGNOSIS — N63.25: Primary | ICD-10-CM

## 2024-09-26 PROCEDURE — 76641 ULTRASOUND BREAST COMPLETE: CPT

## 2024-09-26 NOTE — US_ITS
PROCEDURE INFORMATION:  
Exam: US Left Breast, Complete  
Exam date and time: 9/26/2024 9:48 AM  
Age: 34 years old  
Clinical indication: Induration of breast; Left; Additional info: Lft  
breast  
lump  
 
TECHNIQUE:  
Imaging protocol: Complete ultrasound of all four quadrants of the  
left breast  
and the retroareolar regions, including ultrasound of the axilla when  
performed.  
 
COMPARISON:  
No relevant prior studies available.  
 
FINDINGS:  
 
ULTRASOUND:  
Breast ultrasound findings:  
Sonographic images of the left breast including the retroareolar  
region, all 4  
quadrants and the axilla do not demonstrate any solid or cystic  
masses. This is  
with particular attention to the 12 o'clock axis 7 cm from the nipple  
where the  
patient reports a palpable abnormality. No architectural distortion  
or  
acoustical shadowing. No skin thickening or axillary adenopathy. 
 
IMPRESSION:  
A skin marker should be placed over the area of palpable concern  
followed by a  
diagnostic unilateral mammogram with spot compression views for full  
evaluation  
of the patient's complaint of a palpable abnormality.  
 
ASSESSMENT:  
BI-RADS Category 0: Incomplete- Need Additional Imaging Evaluation  
 
Electronically signed by Shavonne Shin MD at 09/30/2024 07:28

## 2024-10-02 ENCOUNTER — HOSPITAL ENCOUNTER (OUTPATIENT)
Dept: HOSPITAL 22 - RAD | Age: 34
Discharge: HOME | End: 2024-10-02
Payer: COMMERCIAL

## 2024-10-02 DIAGNOSIS — N63.20: Primary | ICD-10-CM

## 2024-10-02 PROCEDURE — G0279 TOMOSYNTHESIS, MAMMO: HCPCS

## 2024-10-02 PROCEDURE — 77065 DX MAMMO INCL CAD UNI: CPT

## 2024-10-02 PROCEDURE — 77061 BREAST TOMOSYNTHESIS UNI: CPT

## 2024-10-02 NOTE — MM_ITS
PROCEDURE INFORMATION:  
Exam:  Left Diagnostic Breast Tomosynthesis  
Exam date and time: 10/2/2024 1:40 PM  
Age: 34 years old  
Clinical indication:  Region of palpable concern left breast.  Recent  
negative  
ultrasound. 
 
TECHNIQUE:  
Imaging protocol: Left Diagnostic tomosynthesis and 2D mammography  
including  
computer-aided detection (CAD) when performed. Unilateral or  
bilateral exam.  
 
COMPARISON:  
US BREAST LT COMPLETE 9/26/2024 9:48 AM  
 
FINDINGS:  
 
MAMMOGRAPHY:  
Breast composition: There are scattered areas of fibroglandular  
density. 
 
Breast mammogram findings:  
Left breast full field and spot-compression tomosynthesis views were  
obtained.   
A radiopaque marker was placed over the region of palpable concern.   
Mass: No suspicious masses.   
Architectural distortion: None.  
Calcifications: No suspicious calcifications.  
Asymmetric density: None.  
Skin thickening: None.  
Axillary adenopathy: None.  
 
IMPRESSION:  
No mammographic finding to explain the patient's region of palpable  
concern.   
Recommend clinical follow-up. There are no findings suspicious for  
malignancy.   
Annual mammographic screening is recommended to commence at age 40  
unless  
otherwise clinically indicated. 
 
ASSESSMENT:  
BI-RADS Category 1: Negative.  
 
Electronically signed by Raza Boone DO at 10/04/2024 11:48

## 2024-10-14 ENCOUNTER — TELEPHONE (OUTPATIENT)
Age: 34
End: 2024-10-14
Payer: MEDICAID

## 2024-10-15 ENCOUNTER — TELEPHONE (OUTPATIENT)
Age: 34
End: 2024-10-15

## 2024-10-15 NOTE — TELEPHONE ENCOUNTER
CALLED THE PT BECAUSE SHE MISSED HER APPT THIS MORNING, LEFT A VM. PT CAN BE RESCHEDULED. SENDING A NO SHOW LETTER AS WELL.

## 2025-04-26 ENCOUNTER — HOSPITAL ENCOUNTER (OUTPATIENT)
Age: 35
Discharge: HOME | End: 2025-04-26
Payer: MEDICAID

## 2025-04-26 DIAGNOSIS — R19.7: Primary | ICD-10-CM

## 2025-04-26 PROCEDURE — 87507 IADNA-DNA/RNA PROBE TQ 12-25: CPT
